# Patient Record
Sex: MALE | Race: OTHER | Employment: FULL TIME | URBAN - METROPOLITAN AREA
[De-identification: names, ages, dates, MRNs, and addresses within clinical notes are randomized per-mention and may not be internally consistent; named-entity substitution may affect disease eponyms.]

---

## 2023-04-14 ENCOUNTER — APPOINTMENT (OUTPATIENT)
Dept: GENERAL RADIOLOGY | Age: 43
End: 2023-04-14
Payer: COMMERCIAL

## 2023-04-14 ENCOUNTER — HOSPITAL ENCOUNTER (EMERGENCY)
Age: 43
Discharge: HOME | End: 2023-04-14
Attending: EMERGENCY MEDICINE
Payer: COMMERCIAL

## 2023-04-14 VITALS
TEMPERATURE: 99.4 F | DIASTOLIC BLOOD PRESSURE: 85 MMHG | HEART RATE: 91 BPM | SYSTOLIC BLOOD PRESSURE: 127 MMHG | OXYGEN SATURATION: 97 %

## 2023-04-14 DIAGNOSIS — J06.9 VIRAL URI WITH COUGH: Primary | ICD-10-CM

## 2023-04-14 LAB
FLUAV RNA SPEC QL NAA+PROBE: NOT DETECTED
FLUBV RNA SPEC QL NAA+PROBE: NOT DETECTED
SARS-COV-2 RDRP RESP QL NAA+PROBE: NOT DETECTED
SOURCE: NORMAL
STREP, MOLECULAR: NOT DETECTED

## 2023-04-14 PROCEDURE — 87502 INFLUENZA DNA AMP PROBE: CPT

## 2023-04-14 PROCEDURE — 6370000000 HC RX 637 (ALT 250 FOR IP)

## 2023-04-14 PROCEDURE — 87635 SARS-COV-2 COVID-19 AMP PRB: CPT

## 2023-04-14 PROCEDURE — 71046 X-RAY EXAM CHEST 2 VIEWS: CPT

## 2023-04-14 PROCEDURE — 87651 STREP A DNA AMP PROBE: CPT

## 2023-04-14 PROCEDURE — 99284 EMERGENCY DEPT VISIT MOD MDM: CPT

## 2023-04-14 RX ORDER — BROMPHENIRAMINE MALEATE, PSEUDOEPHEDRINE HYDROCHLORIDE, AND DEXTROMETHORPHAN HYDROBROMIDE 2; 30; 10 MG/5ML; MG/5ML; MG/5ML
5 SYRUP ORAL 4 TIMES DAILY PRN
Qty: 100 ML | Refills: 0 | Status: SHIPPED | OUTPATIENT
Start: 2023-04-14 | End: 2023-04-19

## 2023-04-14 RX ORDER — FLUTICASONE PROPIONATE 50 MCG
1 SPRAY, SUSPENSION (ML) NASAL DAILY
Qty: 32 G | Refills: 1 | Status: SHIPPED | OUTPATIENT
Start: 2023-04-14

## 2023-04-14 RX ORDER — ACETAMINOPHEN 325 MG/1
650 TABLET ORAL EVERY 4 HOURS PRN
Status: DISCONTINUED | OUTPATIENT
Start: 2023-04-14 | End: 2023-04-14 | Stop reason: HOSPADM

## 2023-04-14 RX ADMIN — ACETAMINOPHEN 650 MG: 325 TABLET ORAL at 10:46

## 2023-04-14 NOTE — DISCHARGE INSTRUCTIONS
Emergent cause of your symptoms at today's visit. Please begin taking the cough medicine as needed. Please use the Flonase nasal spray daily. Please purchase a daily antihistamine such as Zyrtec, Claritin, or Allegra to begin using. Please follow-up with your primary care provider in the next 2 to 3 days for reevaluation.   If you begin to experience any new or worsening symptoms return for reevaluation

## 2023-04-14 NOTE — ED TRIAGE NOTES
Started last week- body ache- headache, fever at home. Patient took Theraflu, dizziness, cough,  (flu like symptom).

## 2023-04-14 NOTE — ED PROVIDER NOTES
display      Discharge Medication List as of 4/14/2023 12:31 PM        START taking these medications    Details   brompheniramine-pseudoephedrine-DM 2-30-10 MG/5ML syrup Take 5 mLs by mouth 4 times daily as needed for Congestion or Cough, Disp-100 mL, R-0Print      fluticasone (FLONASE) 50 MCG/ACT nasal spray 1 spray by Each Nostril route daily, Disp-32 g, R-1Print              No past medical history on file. No past surgical history on file. Results for orders placed or performed during the hospital encounter of 04/14/23   Influenza A/B, Molecular    Specimen: Not Specified   Result Value Ref Range    Influenza A, JENNIFER Not detected NOTD      Influenza B, JENNIFER Not detected NOTD     COVID-19, Rapid    Specimen: Nasopharyngeal   Result Value Ref Range    Source NASAL      SARS-CoV-2, Rapid Not detected NOTD     Group A Strep Screen By PCR    Specimen: Swab   Result Value Ref Range    Strep, Molecular Not detected NOTD     XR CHEST (2 VW)    Narrative    EXAM: XR CHEST (2 VW)  INDICATION: cough  COMPARISON: The    FINDINGS:  The cardiomediastinal silhouette is within normal limits. No focal parenchymal  process. No pleural effusion. No pneumothorax. No acute osseous abnormality. Impression    No acute cardiopulmonary abnormality. XR CHEST (2 VW)   Final Result   No acute cardiopulmonary abnormality. Voice dictation software was used during the making of this note. This software is not perfect and grammatical and other typographical errors may be present. This note has not been completely proofread for errors.      Madeline Sosa, [place]  04/14/23 1823

## 2023-04-14 NOTE — ED NOTES
I have reviewed discharge instructions with the patient. The patient verbalized understanding. Patient left ED via Discharge Method: ambulatory to Home with self    Opportunity for questions and clarification provided. Patient given 2 scripts. To continue your aftercare when you leave the hospital, you may receive an automated call from our care team to check in on how you are doing. This is a free service and part of our promise to provide the best care and service to meet your aftercare needs.  If you have questions, or wish to unsubscribe from this service please call [phone]. Thank you for Choosing our [facility] Emergency Department.         Clemencia Edmonds RN  04/14/23 0337